# Patient Record
Sex: MALE | Race: WHITE | NOT HISPANIC OR LATINO | URBAN - METROPOLITAN AREA
[De-identification: names, ages, dates, MRNs, and addresses within clinical notes are randomized per-mention and may not be internally consistent; named-entity substitution may affect disease eponyms.]

---

## 2021-06-29 ENCOUNTER — APPOINTMENT (RX ONLY)
Dept: URBAN - METROPOLITAN AREA CLINIC 24 | Facility: CLINIC | Age: 36
Setting detail: DERMATOLOGY
End: 2021-06-29

## 2021-06-29 DIAGNOSIS — D22 MELANOCYTIC NEVI: ICD-10-CM

## 2021-06-29 DIAGNOSIS — Z85.820 PERSONAL HISTORY OF MALIGNANT MELANOMA OF SKIN: ICD-10-CM

## 2021-06-29 DIAGNOSIS — L81.4 OTHER MELANIN HYPERPIGMENTATION: ICD-10-CM

## 2021-06-29 PROBLEM — D22.62 MELANOCYTIC NEVI OF LEFT UPPER LIMB, INCLUDING SHOULDER: Status: ACTIVE | Noted: 2021-06-29

## 2021-06-29 PROBLEM — D22.5 MELANOCYTIC NEVI OF TRUNK: Status: ACTIVE | Noted: 2021-06-29

## 2021-06-29 PROCEDURE — ? COUNSELING

## 2021-06-29 PROCEDURE — 11300 SHAVE SKIN LESION 0.5 CM/<: CPT | Mod: 76

## 2021-06-29 PROCEDURE — 11300 SHAVE SKIN LESION 0.5 CM/<: CPT

## 2021-06-29 PROCEDURE — 99203 OFFICE O/P NEW LOW 30 MIN: CPT | Mod: 25

## 2021-06-29 PROCEDURE — ? SHAVE REMOVAL

## 2021-06-29 PROCEDURE — ? OTHER

## 2021-06-29 ASSESSMENT — LOCATION DETAILED DESCRIPTION DERM
LOCATION DETAILED: LEFT MID-UPPER BACK
LOCATION DETAILED: RIGHT LATERAL SUPERIOR CHEST
LOCATION DETAILED: RIGHT POSTERIOR SHOULDER
LOCATION DETAILED: LEFT POSTERIOR SHOULDER
LOCATION DETAILED: RIGHT LATERAL INFERIOR CHEST
LOCATION DETAILED: RIGHT MEDIAL KNEE
LOCATION DETAILED: LEFT ANTERIOR SHOULDER

## 2021-06-29 ASSESSMENT — LOCATION SIMPLE DESCRIPTION DERM
LOCATION SIMPLE: RIGHT SHOULDER
LOCATION SIMPLE: LEFT UPPER BACK
LOCATION SIMPLE: RIGHT KNEE
LOCATION SIMPLE: CHEST
LOCATION SIMPLE: LEFT SHOULDER

## 2021-06-29 ASSESSMENT — LOCATION ZONE DERM
LOCATION ZONE: LEG
LOCATION ZONE: TRUNK
LOCATION ZONE: ARM

## 2021-09-21 PROBLEM — R07.89 OTHER CHEST PAIN: Status: ACTIVE | Noted: 2021-09-21

## 2021-10-01 PROBLEM — E78.5 HYPERLIPIDEMIA: Status: ACTIVE | Noted: 2021-10-01

## 2021-10-01 PROBLEM — L40.50 PSORIASIS WITH ARTHROPATHY (HCC): Status: ACTIVE | Noted: 2018-08-22

## 2021-10-01 PROBLEM — M02.30 REITER'S DISEASE (HCC): Status: ACTIVE | Noted: 2021-10-01

## 2021-10-01 PROBLEM — C43.71: Status: ACTIVE | Noted: 2017-11-27

## 2022-01-04 ENCOUNTER — APPOINTMENT (RX ONLY)
Dept: URBAN - METROPOLITAN AREA CLINIC 24 | Facility: CLINIC | Age: 37
Setting detail: DERMATOLOGY
End: 2022-01-04

## 2022-01-04 DIAGNOSIS — D22 MELANOCYTIC NEVI: ICD-10-CM

## 2022-01-04 DIAGNOSIS — Z85.820 PERSONAL HISTORY OF MALIGNANT MELANOMA OF SKIN: ICD-10-CM

## 2022-01-04 DIAGNOSIS — L81.4 OTHER MELANIN HYPERPIGMENTATION: ICD-10-CM

## 2022-01-04 DIAGNOSIS — Z87.2 PERSONAL HISTORY OF DISEASES OF THE SKIN AND SUBCUTANEOUS TISSUE: ICD-10-CM

## 2022-01-04 PROBLEM — D22.5 MELANOCYTIC NEVI OF TRUNK: Status: ACTIVE | Noted: 2022-01-04

## 2022-01-04 PROBLEM — D48.5 NEOPLASM OF UNCERTAIN BEHAVIOR OF SKIN: Status: ACTIVE | Noted: 2022-01-04

## 2022-01-04 PROCEDURE — ? OTHER

## 2022-01-04 PROCEDURE — ? SHAVE REMOVAL

## 2022-01-04 PROCEDURE — ? COUNSELING

## 2022-01-04 PROCEDURE — 99213 OFFICE O/P EST LOW 20 MIN: CPT | Mod: 25

## 2022-01-04 PROCEDURE — 11305 SHAVE SKIN LESION 0.5 CM/<: CPT

## 2022-01-04 ASSESSMENT — LOCATION ZONE DERM
LOCATION ZONE: NECK
LOCATION ZONE: LEG
LOCATION ZONE: TRUNK
LOCATION ZONE: ARM

## 2022-01-04 ASSESSMENT — LOCATION DETAILED DESCRIPTION DERM
LOCATION DETAILED: RIGHT INFERIOR ANTERIOR NECK
LOCATION DETAILED: RIGHT MEDIAL KNEE
LOCATION DETAILED: RIGHT POSTERIOR SHOULDER
LOCATION DETAILED: LEFT POSTERIOR SHOULDER
LOCATION DETAILED: RIGHT LATERAL SUPERIOR CHEST
LOCATION DETAILED: LEFT MID-UPPER BACK
LOCATION DETAILED: RIGHT LATERAL INFERIOR CHEST
LOCATION DETAILED: LEFT ANTERIOR SHOULDER

## 2022-01-04 ASSESSMENT — LOCATION SIMPLE DESCRIPTION DERM
LOCATION SIMPLE: LEFT UPPER BACK
LOCATION SIMPLE: RIGHT ANTERIOR NECK
LOCATION SIMPLE: LEFT SHOULDER
LOCATION SIMPLE: CHEST
LOCATION SIMPLE: RIGHT KNEE
LOCATION SIMPLE: RIGHT SHOULDER

## 2022-03-19 PROBLEM — L40.50 PSORIASIS WITH ARTHROPATHY (HCC): Status: ACTIVE | Noted: 2018-08-22

## 2022-03-19 PROBLEM — C43.71: Status: ACTIVE | Noted: 2017-11-27

## 2022-03-19 PROBLEM — R07.89 OTHER CHEST PAIN: Status: ACTIVE | Noted: 2021-09-21

## 2022-03-19 PROBLEM — E78.5 HYPERLIPIDEMIA: Status: ACTIVE | Noted: 2021-10-01

## 2022-03-20 PROBLEM — M02.30 REITER'S DISEASE (HCC): Status: ACTIVE | Noted: 2021-10-01

## 2022-05-18 ENCOUNTER — TRANSCRIBE ORDER (OUTPATIENT)
Dept: SCHEDULING | Age: 37
End: 2022-05-18

## 2022-06-09 RX ORDER — LISINOPRIL 10 MG/1
TABLET ORAL
Qty: 90 TABLET | Refills: 3 | Status: SHIPPED | OUTPATIENT
Start: 2022-06-09 | End: 2022-09-21 | Stop reason: SDUPTHER

## 2022-07-05 ENCOUNTER — TELEPHONE (OUTPATIENT)
Dept: RHEUMATOLOGY | Age: 37
End: 2022-07-05

## 2022-07-05 RX ORDER — IXEKIZUMAB 80 MG/ML
80 INJECTION, SOLUTION SUBCUTANEOUS
Qty: 3 ML | Refills: 1 | Status: CANCELLED | OUTPATIENT
Start: 2022-07-05

## 2022-07-06 NOTE — TELEPHONE ENCOUNTER
I cannot approve this as I do not have access to neg tb / hep panel that should have been sent by prev rheum. If we cannot get this then needs to be repeated.

## 2022-07-06 NOTE — TELEPHONE ENCOUNTER
PHONE CALL to patient to see if he could come by for labs- QFG and Hepatitis testing. Has 2 Taltz in the [address] takes it on the 15th of each month. I have scheduled him for 8/1/22 to come in for labs. Do you want any other tests other than the TB and hepatitis?  His next Ov is January 2023

## 2022-07-06 NOTE — TELEPHONE ENCOUNTER
Not sure if records have been received to continue the [address]. Entering rx to go to [address]- review and sign if you approve.  Next Ov is oli for Jan.

## 2022-07-06 NOTE — TELEPHONE ENCOUNTER
----- Message from Surya Conway MA sent at 5/19/2022  9:57 PM EDT -----  Regarding: Kody Ng MD [facility], this may be a premature message but patient is currently taking Tracy Spindle as prescribed by his previous rheumatologist. Jacky Chapman has been receiving from [facility] are requesting records including hepatitis, TB testing and will have to repeat if not current. Alberta Pino you at least investigate pharmacy information on him and we can place refill orders once basic screening labs are available? Nawaf Masters you

## 2022-09-20 ENCOUNTER — TELEPHONE (OUTPATIENT)
Dept: CARDIOLOGY CLINIC | Age: 37
End: 2022-09-20

## 2022-09-20 NOTE — TELEPHONE ENCOUNTER
MEDICATION REFILL REQUEST      Name of Medication:  Lisinopril   Dose:  10 mg  Frequency:    Quantity:    Days' supply:  80      Pharmacy Name/Location:  did not leave     Patient stated on voicemail that he needed to give new insurance information to nurse.  Please call patient

## 2022-09-21 RX ORDER — LISINOPRIL 10 MG/1
10 TABLET ORAL DAILY
Qty: 90 TABLET | Refills: 0 | Status: SHIPPED | OUTPATIENT
Start: 2022-09-21

## 2022-11-03 NOTE — TELEPHONE ENCOUNTER
Called the Cell # listed for him. [phone]. The person I spoke with is named Jagdeep Medrano, but he lives in [facility] and his birthday is not [birth year]. He used to live in [place], [place], but not in [place]. The home number has a VM - LEFT MESSAGE on the  for him to call back regarding his labs. Patient called back- due Nov 15, dec 15. Refills are still on rx from his previous dr. Sepideh Huang pt for labs 11/7/22.

## 2022-11-07 ENCOUNTER — NURSE ONLY (OUTPATIENT)
Dept: RHEUMATOLOGY | Age: 37
End: 2022-11-07

## 2022-11-07 DIAGNOSIS — L40.50 PSORIATIC ARTHRITIS (HCC): ICD-10-CM

## 2022-11-07 LAB
ALBUMIN SERPL-MCNC: 4.4 G/DL (ref 3.5–5)
ALBUMIN/GLOB SERPL: 1.4 {RATIO} (ref 0.4–1.6)
ALP SERPL-CCNC: 50 U/L (ref 50–136)
ALT SERPL-CCNC: 35 U/L (ref 12–65)
ANION GAP SERPL CALC-SCNC: 2 MMOL/L (ref 2–11)
AST SERPL-CCNC: 27 U/L (ref 15–37)
BASOPHILS # BLD: 0.1 K/UL (ref 0–0.2)
BASOPHILS NFR BLD: 1 % (ref 0–2)
BILIRUB SERPL-MCNC: 0.4 MG/DL (ref 0.2–1.1)
BUN SERPL-MCNC: 17 MG/DL (ref 6–23)
CALCIUM SERPL-MCNC: 10.1 MG/DL (ref 8.3–10.4)
CHLORIDE SERPL-SCNC: 104 MMOL/L (ref 101–110)
CO2 SERPL-SCNC: 29 MMOL/L (ref 21–32)
CREAT SERPL-MCNC: 1.5 MG/DL (ref 0.8–1.5)
CRP SERPL-MCNC: <0.3 MG/DL (ref 0–0.9)
DIFFERENTIAL METHOD BLD: ABNORMAL
EOSINOPHIL # BLD: 0.5 K/UL (ref 0–0.8)
EOSINOPHIL NFR BLD: 10 % (ref 0.5–7.8)
ERYTHROCYTE [DISTWIDTH] IN BLOOD BY AUTOMATED COUNT: 13 % (ref 11.9–14.6)
ERYTHROCYTE [SEDIMENTATION RATE] IN BLOOD: 12 MM/HR (ref 0–20)
GLOBULIN SER CALC-MCNC: 3.1 G/DL (ref 2.8–4.5)
GLUCOSE SERPL-MCNC: 89 MG/DL (ref 65–100)
HAV IGM SER QL: NONREACTIVE
HBV CORE IGM SER QL: NONREACTIVE
HBV SURFACE AG SER QL: NONREACTIVE
HCT VFR BLD AUTO: 47.4 % (ref 41.1–50.3)
HCV AB SER QL: NONREACTIVE
HGB BLD-MCNC: 16.2 G/DL (ref 13.6–17.2)
IMM GRANULOCYTES # BLD AUTO: 0 K/UL (ref 0–0.5)
IMM GRANULOCYTES NFR BLD AUTO: 0 % (ref 0–5)
LYMPHOCYTES # BLD: 1.9 K/UL (ref 0.5–4.6)
LYMPHOCYTES NFR BLD: 37 % (ref 13–44)
MCH RBC QN AUTO: 30.1 PG (ref 26.1–32.9)
MCHC RBC AUTO-ENTMCNC: 34.2 G/DL (ref 31.4–35)
MCV RBC AUTO: 88.1 FL (ref 82–102)
MONOCYTES # BLD: 0.5 K/UL (ref 0.1–1.3)
MONOCYTES NFR BLD: 10 % (ref 4–12)
NEUTS SEG # BLD: 2.1 K/UL (ref 1.7–8.2)
NEUTS SEG NFR BLD: 42 % (ref 43–78)
NRBC # BLD: 0 K/UL (ref 0–0.2)
PLATELET # BLD AUTO: 254 K/UL (ref 150–450)
PMV BLD AUTO: 10.1 FL (ref 9.4–12.3)
POTASSIUM SERPL-SCNC: 4.4 MMOL/L (ref 3.5–5.1)
PROT SERPL-MCNC: 7.5 G/DL (ref 6.3–8.2)
RBC # BLD AUTO: 5.38 M/UL (ref 4.23–5.6)
SODIUM SERPL-SCNC: 135 MMOL/L (ref 133–143)
WBC # BLD AUTO: 5.1 K/UL (ref 4.3–11.1)

## 2022-11-17 LAB
M TB IFN-G BLD-IMP: NEGATIVE
QUANTIFERON CRITERIA: NORMAL
QUANTIFERON MITOGEN VALUE: >10 IU/ML
QUANTIFERON NIL VALUE: 0.05 IU/ML
QUANTIFERON TB1 AG: 0.37 IU/ML
QUANTIFERON TB2 AG: 0.33 IU/ML
QUANTIFERON, INCUBATION: NORMAL

## 2022-11-18 NOTE — TELEPHONE ENCOUNTER
Labs done 11/7/22. Next Ov is 1/3/23 with Dr. Trinh Stallworth. Needs Taltz rx to last until next visit.  Rx entered and pended for review and sig    Lab Results   Component Value Date    WBC 5.1 11/07/2022    HGB 16.2 11/07/2022    HCT 47.4 11/07/2022    MCV 88.1 11/07/2022     11/07/2022     Lab Results   Component Value Date     11/07/2022    K 4.4 11/07/2022     11/07/2022    CO2 29 11/07/2022    BUN 17 11/07/2022    CREATININE 1.50 11/07/2022    GLUCOSE 89 11/07/2022    CALCIUM 10.1 11/07/2022    PROT 7.5 11/07/2022    LABALBU 4.4 11/07/2022    BILITOT 0.4 11/07/2022    ALKPHOS 50 11/07/2022    AST 27 11/07/2022    ALT 35 11/07/2022    LABGLOM >60 11/07/2022    GLOB 3.1 11/07/2022     Lab Results   Component Value Date    CRP <0.3 11/07/2022

## 2022-11-29 RX ORDER — IXEKIZUMAB 80 MG/ML
80 INJECTION, SOLUTION SUBCUTANEOUS
Qty: 1 ML | Refills: 1 | Status: SHIPPED | OUTPATIENT
Start: 2022-11-29

## 2023-03-13 ENCOUNTER — OFFICE VISIT (OUTPATIENT)
Dept: RHEUMATOLOGY | Age: 38
End: 2023-03-13
Payer: COMMERCIAL

## 2023-03-13 VITALS
SYSTOLIC BLOOD PRESSURE: 138 MMHG | HEART RATE: 89 BPM | WEIGHT: 187 LBS | HEIGHT: 66 IN | DIASTOLIC BLOOD PRESSURE: 94 MMHG | BODY MASS INDEX: 30.05 KG/M2

## 2023-03-13 DIAGNOSIS — L40.50 PSORIATIC ARTHRITIS (HCC): Primary | ICD-10-CM

## 2023-03-13 DIAGNOSIS — M76.61 RIGHT ACHILLES TENDINITIS: ICD-10-CM

## 2023-03-13 DIAGNOSIS — Z51.81 MEDICATION MONITORING ENCOUNTER: ICD-10-CM

## 2023-03-13 DIAGNOSIS — H53.143 PHOTOPHOBIA OF BOTH EYES: ICD-10-CM

## 2023-03-13 DIAGNOSIS — I10 PRIMARY HYPERTENSION: ICD-10-CM

## 2023-03-13 PROCEDURE — 99214 OFFICE O/P EST MOD 30 MIN: CPT | Performed by: INTERNAL MEDICINE

## 2023-03-13 PROCEDURE — 3075F SYST BP GE 130 - 139MM HG: CPT | Performed by: INTERNAL MEDICINE

## 2023-03-13 PROCEDURE — 3080F DIAST BP >= 90 MM HG: CPT | Performed by: INTERNAL MEDICINE

## 2023-03-13 RX ORDER — DICLOFENAC SODIUM 20 MG/G
1 SOLUTION TOPICAL 2 TIMES DAILY PRN
Qty: 112 G | Refills: 2 | Status: SHIPPED | OUTPATIENT
Start: 2023-03-13

## 2023-03-13 ASSESSMENT — ROUTINE ASSESSMENT OF PATIENT INDEX DATA (RAPID3)
WHEN YOU AWAKENED IN THE MORNING OVER THE LAST WEEK, PLEASE INDICATE THE AMOUNT OF TIME IT TAKES UNTIL YOU ARE AS LIMBER AS YOU WILL BE FOR THE DAY: < 10 MIN
ON A SCALE OF ONE TO TEN, HOW MUCH OF A PROBLEM HAS UNUSUAL FATIGUE OR TIREDNESS BEEN FOR YOU OVER THE PAST WEEK?: 0
ON A SCALE OF ONE TO TEN, HOW MUCH PAIN HAVE YOU HAD BECAUSE OF YOUR CONDITION OVER THE PAST WEEK?: 3
ON A SCALE OF ONE TO TEN, HOW DIFFICULT WAS IT FOR YOU TO COMPLETE THE LISTED DAILY PHYSICAL TASKS OVER THE LAST WEEK: 0.5
ON A SCALE OF ONE TO TEN, CONSIDERING ALL THE WAYS IN WHICH ILLNESS AND HEALTH CONDITIONS MAY AFFECT YOU AT THIS TIME, PLEASE INDICATE BELOW HOW YOU ARE DOING:: 5

## 2023-03-13 NOTE — PROGRESS NOTES
`3/13/2023    SUBJECTIVE:  Israel Aly is a 44 y.o. 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 Maintained on taltz    R heel pain is worse - tried aleve 2 tabs without benefit. Pain is 24h/day but worse with walking. Also episodic pain with R heel, plantar fascia. Heel pain has increased over the last year. He is unable to exercise due to symptoms. Previously saw Dr. Av Stephens reviewed [address]-diagnosed with right Achilles tendinitis and recommended to return if symptoms increase. Since Aleve has not been beneficial he is not using it. Bilateral #1 MTP pain with walking with light trauma. Has jock itch rash that recurred-she started using [employer] and rash is resolved now-now only has mild redness. Onset few months ago. Noticing enlargement in bilateral big toes. Denies dactylitis  Bilateral Eye redness & itching. No blurry vision or pain. +photophobia. Using eye drop otc  TID-which is helping. No recent evaluation by ophthalmology    No rash or nail changes  Denies bloody bowel movements, stomach pain, nausea, vomiting  Taking taltz in 2 days.       REVIEW OF SYSTEMS:  A total of 10 systems (musculoskeletal system as stated in the HPI and the following 9 systems) were reviewed with patient today and were negative except as stated in the HPI and except for the following (depicted with an \"X\"):        \"X\" Constitutional  \"X\" HEENT /Mouth  \"X\" Cardiovascular and  Respiratory (2 systems)  \"X\" Gastrointestinal    Fever/chills   Hair loss   Shortness of breath   Upset stomach    Falls   Dry mouth   Coughing   Diarrhea / constipation    Wt loss   Mouth sores   Wheezing   Heartburn    Wt gain   Ringing ears   Chest pain   Dark or bloody stools    Night sweats   Diff. swallowing  X None of above   Nausea or vomiting   X None of above  X None of above     X None of above                \"X\" Integumentary  \"X\" Neurological  \"X\" Genitourinary  \"X\" Psychiatric    Easy bruising   Numbness/ tingling   Female problems  x Depression    Rashes   Weakness   Problems with urination  x Feeling anxious    Sun sensitivity   Headaches  X None of above  x Problems sleeping   X None of above  X None of above      None of above       The following portions of the patient's history were reviewed and updated as appropriate:     Current Outpatient Medications:     Diclofenac Sodium 2 % SOLN, Apply 1 Pump topically 2 times daily as needed (pain), Disp: 112 g, Rfl: 2    ixekizumab (TALTZ) 80 MG/ML SOAJ prefilled syringe, Inject 1 mL into the skin every 28 days, Disp: 1 mL, Rfl: 1    lisinopril (PRINIVIL;ZESTRIL) 10 MG tablet, Take 1 tablet by mouth daily, Disp: 90 tablet, Rfl: 0    LORazepam (ATIVAN) 1 MG tablet, Take by mouth every 4 hours as needed. , Disp: , Rfl:         No Known Allergies        PHYSICAL EXAM:  BP (!) 138/94   Pulse 89   Ht 5' 6\" (1.676 m)   Wt 187 lb (84.8 kg)   BMI 30.18 kg/m²   CONSTITUTIONAL:  The patient was in NAD.  ENT:  The OPC, MMM, lips/teeth/gums without abnormalities. NECK:  No masses or thyromegaly  LYMPH NODES:  No cervical or axillary lymphadenopathy. CV:  RRR no r/m/g. Normal peripheral pulses  RESP:  CTA bilaterally. Normal respiratory effort. GI:  Abdomen soft, non tender, no hepatosplenomegaly  Skin:  No rashes, nodules, or other lesions. MUSCULOSKELETAL :  All joints including neck, back bilateral shoulders, elbows, wrists, MCP's, PIP's, DIP's, hips, knees, ankles, toes are within normal limits including normal gross examination, no synovitis, no tenderness, n'l ROM EXCEPT:       ASSESSMENT AND PLAN:  Abbi Chanel is a 44 y.o. male that is here for evaluation of psoriatic arthritis. his problems include:    Meenakshi Florentino was seen today for follow-up.     Diagnoses and all orders for this visit:    Psoriatic arthritis ([facility] [address]    Medication monitoring encounter  Continue Taltz. Appears medication is improving inflammatory complaints. Check additional diagnostic labs with next set-he would like to do in the next few months  -     Rheumatoid Factor; Future  -     HLA-B27 Antigen; Future  -     CCP Antibodies, IGG/IGA; Future  -     CBC with Auto Differential; Standing  -     Comprehensive Metabolic Panel; Standing  -     C-Reactive Protein; Standing  -     Sedimentation Rate; Standing      Photophobia of both eyes  Reports symptoms bilateral redness, itchiness improved with OTC drop. Associated photophobia. Referred to ophthalmology  -     [address]    Right Achilles tendinitis  Primary hypertension    Ongoing chronic symptoms of right Achilles tendinitis which are increasing in nature in the last 1 year. No benefit with Aleve for 440 Mg as needed. Given sample of Pennsaid and I have sent in prescription now to local pharmacy per his request.  Recommend oral scheduled NSAID for around 2 weeks in addition to PT. He would like to try topical NSAID and will initiate ibuprofen not to exceed 800 mg 3 times daily if no benefit. Advised patient to check blood pressure at home as he does have hypertension. Other orders  -     Diclofenac Sodium 2 % SOLN; Apply 1 Pump topically 2 times daily as needed (pain)    Patient would like to do labs in the next few months including ESR, CRP, B27, RF, CCP. Follow-up in 6 months or sooner if needed: Standing    Lemuel Carrington MD  [address]  [address] [facility], [address]  [phone]

## 2023-03-13 NOTE — PATIENT INSTRUCTIONS
Ibuprofen 800 mg three times daily  OR  Naproxen 500 mg twice daily     With diclofenac 1 % gel four times daily - OTC

## 2023-03-21 ENCOUNTER — TELEPHONE (OUTPATIENT)
Dept: RHEUMATOLOGY | Age: 38
End: 2023-03-21

## 2023-03-21 NOTE — TELEPHONE ENCOUNTER
PA for Diclofenac 2% solution started online on CMM. PA approved by [employer]. Approved today  [facility][id number]:[id number]; Review Type:Prior Auth; Coverage Start Date:02/19/2023; Coverage End Date:03/20/2024;

## 2023-09-07 ENCOUNTER — TELEPHONE (OUTPATIENT)
Dept: RHEUMATOLOGY | Age: 38
End: 2023-09-07

## 2023-09-07 NOTE — TELEPHONE ENCOUNTER
Tried to reach pt again re: upcoming appt and labs not done. Lvm second time. Have not recd response from first attempt.

## 2023-09-11 ENCOUNTER — TELEPHONE (OUTPATIENT)
Dept: RHEUMATOLOGY | Age: 38
End: 2023-09-11

## 2023-10-02 ENCOUNTER — TELEPHONE (OUTPATIENT)
Dept: RHEUMATOLOGY | Age: 38
End: 2023-10-02

## 2023-10-02 NOTE — TELEPHONE ENCOUNTER
PT LVM over weekend asking for refill of taltz. Pt appt was moved due to labs missing. Pt has not done labs. Tried to rtc to pt  Unable to lvm or reach pt, no login to new [employer]. VM IS FULL. PT NEEDS TO DO LABS. Prior messages that were able to be left from early Sept relayed this to pt.

## 2023-10-02 NOTE — TELEPHONE ENCOUNTER
Pt rtc. Was not aware that vm was full. Asks about lab locations. Closest to [place]. Will do labs today or tues.

## 2023-10-03 DIAGNOSIS — L40.50 PSORIATIC ARTHRITIS (HCC): ICD-10-CM

## 2023-10-03 LAB
ALBUMIN SERPL-MCNC: 4 G/DL (ref 3.5–5)
ALBUMIN/GLOB SERPL: 1.3 (ref 0.4–1.6)
ALP SERPL-CCNC: 50 U/L (ref 50–136)
ALT SERPL-CCNC: 50 U/L (ref 12–65)
ANION GAP SERPL CALC-SCNC: 6 MMOL/L (ref 2–11)
AST SERPL-CCNC: 41 U/L (ref 15–37)
BASOPHILS # BLD: 0 K/UL (ref 0–0.2)
BASOPHILS NFR BLD: 1 % (ref 0–2)
BILIRUB SERPL-MCNC: 0.3 MG/DL (ref 0.2–1.1)
BUN SERPL-MCNC: 23 MG/DL (ref 6–23)
CALCIUM SERPL-MCNC: 9.7 MG/DL (ref 8.3–10.4)
CHLORIDE SERPL-SCNC: 108 MMOL/L (ref 101–110)
CO2 SERPL-SCNC: 29 MMOL/L (ref 21–32)
CREAT SERPL-MCNC: 1.3 MG/DL (ref 0.8–1.5)
CRP SERPL-MCNC: <0.3 MG/DL (ref 0–0.9)
DIFFERENTIAL METHOD BLD: ABNORMAL
EOSINOPHIL # BLD: 0.4 K/UL (ref 0–0.8)
EOSINOPHIL NFR BLD: 7 % (ref 0.5–7.8)
ERYTHROCYTE [DISTWIDTH] IN BLOOD BY AUTOMATED COUNT: 12.9 % (ref 11.9–14.6)
ERYTHROCYTE [SEDIMENTATION RATE] IN BLOOD: 2 MM/HR (ref 0–20)
GLOBULIN SER CALC-MCNC: 3.1 G/DL (ref 2.8–4.5)
GLUCOSE SERPL-MCNC: 85 MG/DL (ref 65–100)
HCT VFR BLD AUTO: 47.1 % (ref 41.1–50.3)
HGB BLD-MCNC: 15.8 G/DL (ref 13.6–17.2)
IMM GRANULOCYTES # BLD AUTO: 0 K/UL (ref 0–0.5)
IMM GRANULOCYTES NFR BLD AUTO: 0 % (ref 0–5)
LYMPHOCYTES # BLD: 2.1 K/UL (ref 0.5–4.6)
LYMPHOCYTES NFR BLD: 41 % (ref 13–44)
MCH RBC QN AUTO: 29.6 PG (ref 26.1–32.9)
MCHC RBC AUTO-ENTMCNC: 33.5 G/DL (ref 31.4–35)
MCV RBC AUTO: 88.4 FL (ref 82–102)
MONOCYTES # BLD: 0.5 K/UL (ref 0.1–1.3)
MONOCYTES NFR BLD: 9 % (ref 4–12)
NEUTS SEG # BLD: 2.1 K/UL (ref 1.7–8.2)
NEUTS SEG NFR BLD: 42 % (ref 43–78)
NRBC # BLD: 0 K/UL (ref 0–0.2)
PLATELET # BLD AUTO: 275 K/UL (ref 150–450)
PMV BLD AUTO: 9.7 FL (ref 9.4–12.3)
POTASSIUM SERPL-SCNC: 4.7 MMOL/L (ref 3.5–5.1)
PROT SERPL-MCNC: 7.1 G/DL (ref 6.3–8.2)
RBC # BLD AUTO: 5.33 M/UL (ref 4.23–5.6)
SODIUM SERPL-SCNC: 143 MMOL/L (ref 133–143)
WBC # BLD AUTO: 5.1 K/UL (ref 4.3–11.1)

## 2023-10-04 LAB
CCP IGA+IGG SERPL IA-ACNC: 4 UNITS (ref 0–19)
RHEUMATOID FACT SER QL LA: NEGATIVE

## 2023-10-09 LAB — HLA-B27 QL NAA+PROBE: POSITIVE

## 2023-10-18 ENCOUNTER — TELEMEDICINE (OUTPATIENT)
Dept: RHEUMATOLOGY | Age: 38
End: 2023-10-18
Payer: COMMERCIAL

## 2023-10-18 DIAGNOSIS — Z51.81 MEDICATION MONITORING ENCOUNTER: ICD-10-CM

## 2023-10-18 DIAGNOSIS — L40.50 PSORIATIC ARTHRITIS (HCC): Primary | ICD-10-CM

## 2023-10-18 DIAGNOSIS — R43.0 LOSS OF SMELL: ICD-10-CM

## 2023-10-18 DIAGNOSIS — R09.81 NASAL CONGESTION: ICD-10-CM

## 2023-10-18 DIAGNOSIS — R74.01 ELEVATED AST (SGOT): ICD-10-CM

## 2023-10-18 DIAGNOSIS — H57.89 REDNESS, EYE: ICD-10-CM

## 2023-10-18 DIAGNOSIS — Z23 ENCOUNTER FOR VACCINATION: ICD-10-CM

## 2023-10-18 PROCEDURE — 99214 OFFICE O/P EST MOD 30 MIN: CPT | Performed by: INTERNAL MEDICINE

## 2023-10-18 RX ORDER — SERTRALINE HYDROCHLORIDE 25 MG/1
25 TABLET, FILM COATED ORAL DAILY
COMMUNITY

## 2023-10-18 RX ORDER — IXEKIZUMAB 80 MG/ML
80 INJECTION, SOLUTION SUBCUTANEOUS
Qty: 1 ML | Refills: 5 | Status: SHIPPED | OUTPATIENT
Start: 2023-10-18

## 2023-10-18 NOTE — PROGRESS NOTES
Vane Larios is a 43 y.o. male who was seen by synchronous (real-time) audio-video technology on 10/18/2023        I was in the office While conducting this encounter. She and/ or her healthcare decision maker is aware that this patient-initialed Telehealth encounter is a billable service with coverage as determined by her insurance carrier. She is aware that she may receive a bill and has provided verbal consent to proceed: Yes    The Virtual visit was conducted via [address] [place] [address]. Pursuant to the emergency declaration under the [place] [address] waiver authority and the [employer] and [employer] Act, this Virtual  Visit was conducted to reduce the patient's risk of exposure to COVID-19 and provide continuity of care for an established patient. Services were provided through a video synchronous discussion virtually to substitute for in-person clinic visit. Due to this being a TeleHealth evaluation, many elements of the physical examination are unable to be assessed. Total Time:  minutes: 21-30 minutes           Assessment & Plan:     Psoriatic arthritis, medication monitoring, elevated AST  - Continue Taltz. Advised patient to follow-up at the end of this week to discuss URI type symptoms to determine if see if to take injection  - AST is less than 2 times upper limit of normal  - Plan for standing labs prior to next office visit in 6 months    Redness of eye, loss of smell, nasal congestion  Ongoing treatment for pinkeye is not improving symptoms. He is taken several COVID home tests that are negative and has associated loss of smell.   - Ophthalmology referral placed for evaluation of chronic eye redness, mild episodic photophobia-concern for possible inflammatory component from psoriasis  - COVID test ordered for patient  - he will follow-up later this week to discuss symptoms and was advised to go to closest emergency room if eye pain

## 2023-11-01 NOTE — TELEPHONE ENCOUNTER
Let him know he has degenerative changes in his neck worse at C5-C6.   He has central canal stenosis and foraminal stenosis.      Refer to Pain Management. I will put in an order. Requested Prescriptions     Pending Prescriptions Disp Refills    lisinopril (PRINIVIL;ZESTRIL) 10 MG tablet [Pharmacy Med Name: LISINOPRIL 10MG TABLETS] 90 tablet 3     Sig: TAKE 1 TABLET BY MOUTH DAILY

## 2023-11-03 ENCOUNTER — TELEPHONE (OUTPATIENT)
Dept: RHEUMATOLOGY | Age: 38
End: 2023-11-03

## 2023-11-03 NOTE — TELEPHONE ENCOUNTER
Letter received from [employer] stating that they have not received requested information from our office. I do not recall receiving any PA request for Taltz. Initiated PA online on CMM, sent with last 2 OV notes to [employer]. PA approved  Message from plan: CaseId:[id number];Status:Approved; Review Type:Prior Auth; Coverage Start Date:10/04/2023; Coverage End Date:11/02/2024;

## 2023-11-22 ENCOUNTER — TELEPHONE (OUTPATIENT)
Age: 38
End: 2023-11-22

## 2023-11-22 NOTE — TELEPHONE ENCOUNTER
Spoke to pt and informed he is due for an appointment. Pt has enough medication, appointment given for 11/28 at 10:15.

## 2023-11-22 NOTE — TELEPHONE ENCOUNTER
MEDICATION REFILL REQUEST      Name of Medication:  Lisinopril  Dose:  10 mg  Frequency:  QD  Quantity:  90  Days' supply:  90      Pharmacy Name/Location:  [phone]

## 2023-11-28 ENCOUNTER — OFFICE VISIT (OUTPATIENT)
Age: 38
End: 2023-11-28
Payer: COMMERCIAL

## 2023-11-28 VITALS
SYSTOLIC BLOOD PRESSURE: 138 MMHG | BODY MASS INDEX: 33.46 KG/M2 | DIASTOLIC BLOOD PRESSURE: 86 MMHG | WEIGHT: 196 LBS | HEIGHT: 64 IN | HEART RATE: 68 BPM

## 2023-11-28 DIAGNOSIS — I10 PRIMARY HYPERTENSION: Primary | ICD-10-CM

## 2023-11-28 PROCEDURE — 3079F DIAST BP 80-89 MM HG: CPT | Performed by: INTERNAL MEDICINE

## 2023-11-28 PROCEDURE — 93000 ELECTROCARDIOGRAM COMPLETE: CPT | Performed by: INTERNAL MEDICINE

## 2023-11-28 PROCEDURE — 99214 OFFICE O/P EST MOD 30 MIN: CPT | Performed by: INTERNAL MEDICINE

## 2023-11-28 PROCEDURE — 3075F SYST BP GE 130 - 139MM HG: CPT | Performed by: INTERNAL MEDICINE

## 2023-11-28 RX ORDER — LISINOPRIL 10 MG/1
10 TABLET ORAL DAILY
Qty: 90 TABLET | Refills: 3 | Status: SHIPPED | OUTPATIENT
Start: 2023-11-28

## 2023-11-28 ASSESSMENT — PATIENT HEALTH QUESTIONNAIRE - PHQ9
SUM OF ALL RESPONSES TO PHQ9 QUESTIONS 1 & 2: 0
SUM OF ALL RESPONSES TO PHQ QUESTIONS 1-9: 0
1. LITTLE INTEREST OR PLEASURE IN DOING THINGS: 0
2. FEELING DOWN, DEPRESSED OR HOPELESS: 0
SUM OF ALL RESPONSES TO PHQ QUESTIONS 1-9: 0

## 2023-11-28 ASSESSMENT — ENCOUNTER SYMPTOMS
ORTHOPNEA: 0
ABDOMINAL PAIN: 0
SHORTNESS OF BREATH: 0

## 2023-11-28 NOTE — PROGRESS NOTES
[facility] CARDIOLOGY  [address] [address]  PHONE: [phone]      23    NAME:  Fidencio Ragsdale  : 1985  MRN: [id number]         SUBJECTIVE:   Fidencio Ragsdale is a 43 y.o. male seen for a follow up visit regarding the following:     No chief complaint on file. HPI:  Follow up  No chief complaint on file. .    Mr. Jovanna Rousseau presents today for follow-up. Patient is doing well. He was last seen by Dr. Erum Beltrán 2 years ago. Had a normal stress echo around that time. Blood pressure he reports has been ranging in the 130s over 80s. Recently was in her recovery program for alcohol and benzo use. Feels better. Works out frequently but typically just does weights, minimal cardio. He endorses cooking with a heavy hand with salt. Key CAD CHF Meds            lisinopril (PRINIVIL;ZESTRIL) 10 MG tablet (Taking)    Sig - Route: Take 1 tablet by mouth daily - Oral          Key Antihyperglycemic Medications       Patient is on no antihyperglycemic meds. Past Medical History, Past Surgical History, Family history, Social History, and Medications were all reviewed with the patient today and updated as necessary. Prior to Admission medications    Medication Sig Start Date End Date Taking? Authorizing Provider   Carboxymethylcellulose Sodium (EYE DROPS OP) Apply to eye   Yes Jeramie Avalos MD   lisinopril (PRINIVIL;ZESTRIL) 10 MG tablet Take 1 tablet by mouth daily 23  Yes Iven Cabot, MD   sertraline (ZOLOFT) 25 MG tablet Take 1 tablet by mouth daily   Yes Jeramie Avalos MD   ixekizumab (TALTZ) 80 MG/ML SOAJ prefilled syringe Inject 1 mL into the skin every 28 days 10/18/23  Yes Virgel Scheuermann, MD     No Known Allergies  Past Medical History:   Diagnosis Date    Alcohol abuse 2015    Alcohol abuse     Arthritis     \"Reactive\" arthritis of feet, ankles, and hands. Difficulty walking.     Chronic anxiety

## 2024-04-23 ENCOUNTER — TELEPHONE (OUTPATIENT)
Dept: RHEUMATOLOGY | Age: 39
End: 2024-04-23

## 2024-04-23 DIAGNOSIS — L40.50 PSORIATIC ARTHRITIS (HCC): Primary | ICD-10-CM

## 2024-04-23 DIAGNOSIS — R74.01 ELEVATED AST (SGOT): ICD-10-CM

## 2024-04-23 NOTE — TELEPHONE ENCOUNTER
Pt asking for refill on taltz  Rtc to pt and got vm.  Lvm.    Pt needs to make f/u appt and update labs.  LOV October, needs 6mo ov.  Requested rtc or mychart msg for pt to schedule, do labs prior and for interim refill.    No active orders, entered x4 \"standing\" as per lab hx

## 2024-04-24 NOTE — TELEPHONE ENCOUNTER
Pt has f/u 5/22 on waitlist.  Doing labs this week, needs refill on taltz to cover.    Waiting on lab results.    Due to inject again 5/15

## 2024-04-29 DIAGNOSIS — R74.01 ELEVATED AST (SGOT): ICD-10-CM

## 2024-04-29 DIAGNOSIS — L40.50 PSORIATIC ARTHRITIS (HCC): ICD-10-CM

## 2024-04-29 LAB
ALBUMIN SERPL-MCNC: 4.5 G/DL (ref 3.5–5)
ALBUMIN/GLOB SERPL: 1.6 (ref 1–1.9)
ALP SERPL-CCNC: 48 U/L (ref 40–129)
ALT SERPL-CCNC: 31 U/L (ref 12–65)
ANION GAP SERPL CALC-SCNC: 9 MMOL/L (ref 9–18)
AST SERPL-CCNC: 38 U/L (ref 15–37)
BASOPHILS # BLD: 0 K/UL (ref 0–0.2)
BASOPHILS NFR BLD: 1 % (ref 0–2)
BILIRUB SERPL-MCNC: 0.3 MG/DL (ref 0–1.2)
BUN SERPL-MCNC: 21 MG/DL (ref 6–23)
CALCIUM SERPL-MCNC: 9.9 MG/DL (ref 8.8–10.2)
CHLORIDE SERPL-SCNC: 102 MMOL/L (ref 98–107)
CO2 SERPL-SCNC: 27 MMOL/L (ref 20–28)
CREAT SERPL-MCNC: 1.12 MG/DL (ref 0.8–1.3)
DIFFERENTIAL METHOD BLD: ABNORMAL
EOSINOPHIL # BLD: 0.4 K/UL (ref 0–0.8)
EOSINOPHIL NFR BLD: 8 % (ref 0.5–7.8)
ERYTHROCYTE [DISTWIDTH] IN BLOOD BY AUTOMATED COUNT: 13.6 % (ref 11.9–14.6)
ERYTHROCYTE [SEDIMENTATION RATE] IN BLOOD: 5 MM/HR (ref 0–20)
GLOBULIN SER CALC-MCNC: 2.7 G/DL (ref 2.3–3.5)
GLUCOSE SERPL-MCNC: 104 MG/DL (ref 70–99)
HCT VFR BLD AUTO: 45.2 % (ref 41.1–50.3)
HGB BLD-MCNC: 14.9 G/DL (ref 13.6–17.2)
IMM GRANULOCYTES # BLD AUTO: 0 K/UL (ref 0–0.5)
IMM GRANULOCYTES NFR BLD AUTO: 0 % (ref 0–5)
LYMPHOCYTES # BLD: 1.8 K/UL (ref 0.5–4.6)
LYMPHOCYTES NFR BLD: 38 % (ref 13–44)
MCH RBC QN AUTO: 28.8 PG (ref 26.1–32.9)
MCHC RBC AUTO-ENTMCNC: 33 G/DL (ref 31.4–35)
MCV RBC AUTO: 87.3 FL (ref 82–102)
MONOCYTES # BLD: 0.4 K/UL (ref 0.1–1.3)
MONOCYTES NFR BLD: 9 % (ref 4–12)
NEUTS SEG # BLD: 2.1 K/UL (ref 1.7–8.2)
NEUTS SEG NFR BLD: 44 % (ref 43–78)
NRBC # BLD: 0 K/UL (ref 0–0.2)
PLATELET # BLD AUTO: 258 K/UL (ref 150–450)
PMV BLD AUTO: 9.7 FL (ref 9.4–12.3)
POTASSIUM SERPL-SCNC: 4.5 MMOL/L (ref 3.5–5.1)
PROT SERPL-MCNC: 7.2 G/DL (ref 6.3–8.2)
RBC # BLD AUTO: 5.18 M/UL (ref 4.23–5.6)
SODIUM SERPL-SCNC: 138 MMOL/L (ref 136–145)
WBC # BLD AUTO: 4.8 K/UL (ref 4.3–11.1)

## 2024-05-01 LAB — CRP SERPL HS-MCNC: 3.5 MG/L (ref 0–3)

## 2024-05-07 ENCOUNTER — OFFICE VISIT (OUTPATIENT)
Dept: RHEUMATOLOGY | Age: 39
End: 2024-05-07
Payer: COMMERCIAL

## 2024-05-07 VITALS
DIASTOLIC BLOOD PRESSURE: 74 MMHG | HEIGHT: 64 IN | SYSTOLIC BLOOD PRESSURE: 128 MMHG | BODY MASS INDEX: 33.63 KG/M2 | WEIGHT: 197 LBS | RESPIRATION RATE: 18 BRPM | HEART RATE: 76 BPM

## 2024-05-07 DIAGNOSIS — L40.50 PSORIATIC ARTHRITIS (HCC): Primary | ICD-10-CM

## 2024-05-07 DIAGNOSIS — M79.674 PAIN IN RIGHT TOE(S): ICD-10-CM

## 2024-05-07 DIAGNOSIS — L40.9 PSORIASIS: ICD-10-CM

## 2024-05-07 DIAGNOSIS — M76.61 RIGHT ACHILLES TENDINITIS: ICD-10-CM

## 2024-05-07 DIAGNOSIS — R79.82 ELEVATED HIGH SENSITIVITY C-REACTIVE PROTEIN: ICD-10-CM

## 2024-05-07 DIAGNOSIS — R74.01 ELEVATED AST (SGOT): ICD-10-CM

## 2024-05-07 DIAGNOSIS — M79.675 PAIN IN LEFT TOE(S): ICD-10-CM

## 2024-05-07 DIAGNOSIS — R21 RASH: ICD-10-CM

## 2024-05-07 DIAGNOSIS — Z51.81 MEDICATION MONITORING ENCOUNTER: ICD-10-CM

## 2024-05-07 PROCEDURE — 3074F SYST BP LT 130 MM HG: CPT | Performed by: INTERNAL MEDICINE

## 2024-05-07 PROCEDURE — 3078F DIAST BP <80 MM HG: CPT | Performed by: INTERNAL MEDICINE

## 2024-05-07 PROCEDURE — 99214 OFFICE O/P EST MOD 30 MIN: CPT | Performed by: INTERNAL MEDICINE

## 2024-05-07 RX ORDER — IXEKIZUMAB 80 MG/ML
80 INJECTION, SOLUTION SUBCUTANEOUS
Qty: 1 ML | Refills: 5 | Status: ACTIVE | OUTPATIENT
Start: 2024-05-07

## 2024-05-07 RX ORDER — DICLOFENAC SODIUM 20 MG/G
1 SOLUTION TOPICAL 2 TIMES DAILY PRN
Qty: 112 G | Refills: 2 | Status: SHIPPED | OUTPATIENT
Start: 2024-05-07

## 2024-05-07 ASSESSMENT — ROUTINE ASSESSMENT OF PATIENT INDEX DATA (RAPID3)
ON A SCALE OF ONE TO TEN, HOW MUCH OF A PROBLEM HAS UNUSUAL FATIGUE OR TIREDNESS BEEN FOR YOU OVER THE PAST WEEK?: 0
ON A SCALE OF ONE TO TEN, HOW MUCH PAIN HAVE YOU HAD BECAUSE OF YOUR CONDITION OVER THE PAST WEEK?: 2
ON A SCALE OF ONE TO TEN, HOW DIFFICULT WAS IT FOR YOU TO COMPLETE THE LISTED DAILY PHYSICAL TASKS OVER THE LAST WEEK: 0.2
WHEN YOU AWAKENED IN THE MORNING OVER THE LAST WEEK, PLEASE INDICATE THE AMOUNT OF TIME IT TAKES UNTIL YOU ARE AS LIMBER AS YOU WILL BE FOR THE DAY: 30 MIN
ON A SCALE OF ONE TO TEN, CONSIDERING ALL THE WAYS IN WHICH ILLNESS AND HEALTH CONDITIONS MAY AFFECT YOU AT THIS TIME, PLEASE INDICATE BELOW HOW YOU ARE DOING:: 3

## 2024-05-07 NOTE — PATIENT INSTRUCTIONS
Ibuprofen 800 mg three times daily    Return in 6 months, do labs in chart one week prior to visit

## 2024-05-07 NOTE — PROGRESS NOTES
`5/7/2024    SUBJECTIVE:  Servando Greene is a 38 y.o. 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 Maintained on taltz. Last dose 4/15/2024. No skipped or missed dose or side effects including fever or unexplained infection    Reports 1 month rash on bilateral plantar surface feet - thinks that this could be athlete's foot. It is itchy    Continues to have bilateral #1 MTP pain and swelling that is constant.     No further jock itch, normal nails, no psoriasis    R heel pain without swelling- episodic - can't run or jump but able to workout, lay with kids. Tried aleve without benefit. Tries to do stretches learned with Dr. Wilson but cannot tolerate wear boot QHS. Symptoms are stable - increase with more physical activity but he can do baseline amount and recover by the next day.   Previously saw Dr. Wilson-note reviewed March-28-20 22-diagnosed with right Achilles tendinitis and recommended to return if symptoms increase.  Since Aleve has not been beneficial he is not using it.      Noticing enlargement in bilateral big toes.  Denies dactylitis  Bilateral Eye redness & itching- unchaged - found to have pink eye and recommended to f/u for complete exam but he hasn't. episodic blurry vision. Episodic eye pain without dryness. +photophobia. Using eye drop otc  TID. [facility]    No rash or nail changes  Denies bloody bowel movements, stomach pain, nausea, vomiting        REVIEW OF SYSTEMS:  A total of 10 systems (musculoskeletal system as stated in the HPI and the following 9 systems) were reviewed with patient today and were negative except as stated in the HPI and except for the following (depicted with an \"X\"):        \"X\" Constitutional  \"X\" HEENT

## 2024-05-07 NOTE — TELEPHONE ENCOUNTER
Pt ov moved up from waitlist.  Seen in clinic today, meds refilled.  Given order for CRP, lab did hscrp and could not do add on.  Requested pt go downstairs after checkout to complete.

## 2024-07-08 ENCOUNTER — APPOINTMENT (RX ONLY)
Dept: URBAN - METROPOLITAN AREA CLINIC 25 | Facility: CLINIC | Age: 39
Setting detail: DERMATOLOGY
End: 2024-07-08

## 2024-07-08 DIAGNOSIS — D22 MELANOCYTIC NEVI: ICD-10-CM

## 2024-07-08 DIAGNOSIS — Z71.89 OTHER SPECIFIED COUNSELING: ICD-10-CM

## 2024-07-08 DIAGNOSIS — L81.4 OTHER MELANIN HYPERPIGMENTATION: ICD-10-CM

## 2024-07-08 DIAGNOSIS — L84 CORNS AND CALLOSITIES: ICD-10-CM

## 2024-07-08 DIAGNOSIS — Z85.820 PERSONAL HISTORY OF MALIGNANT MELANOMA OF SKIN: ICD-10-CM

## 2024-07-08 DIAGNOSIS — L82.1 OTHER SEBORRHEIC KERATOSIS: ICD-10-CM

## 2024-07-08 DIAGNOSIS — L57.8 OTHER SKIN CHANGES DUE TO CHRONIC EXPOSURE TO NONIONIZING RADIATION: ICD-10-CM

## 2024-07-08 DIAGNOSIS — Z87.2 PERSONAL HISTORY OF DISEASES OF THE SKIN AND SUBCUTANEOUS TISSUE: ICD-10-CM

## 2024-07-08 DIAGNOSIS — D18.0 HEMANGIOMA: ICD-10-CM

## 2024-07-08 DIAGNOSIS — L30.4 ERYTHEMA INTERTRIGO: ICD-10-CM

## 2024-07-08 PROBLEM — D18.01 HEMANGIOMA OF SKIN AND SUBCUTANEOUS TISSUE: Status: ACTIVE | Noted: 2024-07-08

## 2024-07-08 PROBLEM — D23.39 OTHER BENIGN NEOPLASM OF SKIN OF OTHER PARTS OF FACE: Status: ACTIVE | Noted: 2024-07-08

## 2024-07-08 PROBLEM — D22.5 MELANOCYTIC NEVI OF TRUNK: Status: ACTIVE | Noted: 2024-07-08

## 2024-07-08 PROCEDURE — ? ADDITIONAL NOTES

## 2024-07-08 PROCEDURE — ? COUNSELING

## 2024-07-08 PROCEDURE — ? PRESCRIPTION MEDICATION MANAGEMENT

## 2024-07-08 PROCEDURE — 99213 OFFICE O/P EST LOW 20 MIN: CPT

## 2024-07-08 ASSESSMENT — LOCATION DETAILED DESCRIPTION DERM
LOCATION DETAILED: RIGHT LATERAL INFERIOR CHEST
LOCATION DETAILED: LEFT CENTRAL MALAR CHEEK
LOCATION DETAILED: LEFT INGUINAL CREASE
LOCATION DETAILED: RIGHT MEDIAL KNEE
LOCATION DETAILED: LEFT MEDIAL GREAT TOE
LOCATION DETAILED: RIGHT INGUINAL CREASE
LOCATION DETAILED: INFERIOR THORACIC SPINE
LOCATION DETAILED: STERNAL NOTCH
LOCATION DETAILED: RIGHT CENTRAL MALAR CHEEK
LOCATION DETAILED: SUPERIOR THORACIC SPINE
LOCATION DETAILED: PERIUMBILICAL SKIN
LOCATION DETAILED: LEFT ANTERIOR SHOULDER

## 2024-07-08 ASSESSMENT — LOCATION SIMPLE DESCRIPTION DERM
LOCATION SIMPLE: GROIN
LOCATION SIMPLE: RIGHT KNEE
LOCATION SIMPLE: ABDOMEN
LOCATION SIMPLE: LEFT CHEEK
LOCATION SIMPLE: LEFT SHOULDER
LOCATION SIMPLE: CHEST
LOCATION SIMPLE: RIGHT CHEEK
LOCATION SIMPLE: LEFT GREAT TOE
LOCATION SIMPLE: UPPER BACK

## 2024-07-08 ASSESSMENT — LOCATION ZONE DERM
LOCATION ZONE: TOE
LOCATION ZONE: LEG
LOCATION ZONE: ARM
LOCATION ZONE: TRUNK
LOCATION ZONE: FACE

## 2024-07-08 NOTE — PROCEDURE: REASSURANCE
Hide Additional Notes?: No
Detail Level: Detailed
Additional Notes (Optional): Bled into a callus, he has limited mobility in toes due to arthritis per patient

## 2024-07-08 NOTE — PROCEDURE: ADDITIONAL NOTES
Detail Level: Simple
Additional Notes: Invasive melanoma requiring a sentinel lymph node biopsy, which was negative. Treated in 2017 at [facility].\\nRequesting records again, not received \\Kathleen this time patient wants to stick with annual check ups, not every 6 months.
Render Risk Assessment In Note?: no

## 2024-07-08 NOTE — PROCEDURE: PRESCRIPTION MEDICATION MANAGEMENT
Detail Level: Zone
Render In Strict Bullet Format?: No
Plan: Pt will call for prescription of Ketoconazole Cream if he fails to improve with OTC treatment.
Initiate Treatment: Zeasorb AF (Apply daily to AA as needed)

## 2024-10-08 ENCOUNTER — TELEPHONE (OUTPATIENT)
Dept: RHEUMATOLOGY | Age: 39
End: 2024-10-08

## 2024-10-08 NOTE — TELEPHONE ENCOUNTER
[employer] requested PA renewal online on [facility]. Completed and sent with clinicals from May.   Your request has been approved  CaseId:[id number];Status:Approved;Review Type:Prior Auth;Coverage Start Date:09/08/2024;Coverage End Date:10/08/2025;

## 2024-11-06 ENCOUNTER — TELEPHONE (OUTPATIENT)
Dept: RHEUMATOLOGY | Age: 39
End: 2024-11-06

## 2024-11-06 DIAGNOSIS — L40.50 PSORIATIC ARTHRITIS (HCC): ICD-10-CM

## 2024-11-06 RX ORDER — IXEKIZUMAB 80 MG/ML
80 INJECTION, SOLUTION SUBCUTANEOUS
Qty: 1 ML | Refills: 0 | Status: ACTIVE | OUTPATIENT
Start: 2024-11-06 | End: 2024-11-12 | Stop reason: SDUPTHER

## 2024-11-06 NOTE — TELEPHONE ENCOUNTER
Pt called and needs a refill on Taltz, he wont have enough to last until his OV. He is getting labs done 11/7/2024    LOV- 5/7/2024  NOV- 11/11/2024

## 2024-11-07 DIAGNOSIS — Z51.81 MEDICATION MONITORING ENCOUNTER: ICD-10-CM

## 2024-11-07 DIAGNOSIS — M79.675 PAIN IN LEFT TOE(S): ICD-10-CM

## 2024-11-07 DIAGNOSIS — M76.61 RIGHT ACHILLES TENDINITIS: ICD-10-CM

## 2024-11-07 DIAGNOSIS — M79.674 PAIN IN RIGHT TOE(S): ICD-10-CM

## 2024-11-07 DIAGNOSIS — L40.50 PSORIATIC ARTHRITIS (HCC): ICD-10-CM

## 2024-11-07 DIAGNOSIS — R74.01 ELEVATED AST (SGOT): ICD-10-CM

## 2024-11-07 LAB
ALBUMIN SERPL-MCNC: 4.4 G/DL (ref 3.5–5)
ALBUMIN/GLOB SERPL: 1.5 (ref 1–1.9)
ALP SERPL-CCNC: 62 U/L (ref 40–129)
ALT SERPL-CCNC: 38 U/L (ref 8–55)
ANION GAP SERPL CALC-SCNC: 11 MMOL/L (ref 7–16)
AST SERPL-CCNC: 40 U/L (ref 15–37)
BASOPHILS # BLD: 0 K/UL (ref 0–0.2)
BASOPHILS NFR BLD: 0 % (ref 0–2)
BILIRUB SERPL-MCNC: 0.2 MG/DL (ref 0–1.2)
BUN SERPL-MCNC: 27 MG/DL (ref 6–23)
CALCIUM SERPL-MCNC: 9.7 MG/DL (ref 8.8–10.2)
CHLORIDE SERPL-SCNC: 102 MMOL/L (ref 98–107)
CO2 SERPL-SCNC: 26 MMOL/L (ref 20–29)
CREAT SERPL-MCNC: 1.34 MG/DL (ref 0.8–1.3)
CRP SERPL-MCNC: 0.9 MG/DL (ref 0–0.4)
DIFFERENTIAL METHOD BLD: ABNORMAL
EOSINOPHIL # BLD: 0 K/UL (ref 0–0.8)
EOSINOPHIL NFR BLD: 0 % (ref 0.5–7.8)
ERYTHROCYTE [DISTWIDTH] IN BLOOD BY AUTOMATED COUNT: 13.2 % (ref 11.9–14.6)
ERYTHROCYTE [SEDIMENTATION RATE] IN BLOOD: 1 MM/HR (ref 0–20)
GLOBULIN SER CALC-MCNC: 3 G/DL (ref 2.3–3.5)
GLUCOSE SERPL-MCNC: 95 MG/DL (ref 70–99)
HCT VFR BLD AUTO: 47.2 % (ref 41.1–50.3)
HGB BLD-MCNC: 15.5 G/DL (ref 13.6–17.2)
IMM GRANULOCYTES # BLD AUTO: 0 K/UL (ref 0–0.5)
IMM GRANULOCYTES NFR BLD AUTO: 0 % (ref 0–5)
LYMPHOCYTES # BLD: 1.6 K/UL (ref 0.5–4.6)
LYMPHOCYTES NFR BLD: 34 % (ref 13–44)
MCH RBC QN AUTO: 29.1 PG (ref 26.1–32.9)
MCHC RBC AUTO-ENTMCNC: 32.8 G/DL (ref 31.4–35)
MCV RBC AUTO: 88.7 FL (ref 82–102)
MONOCYTES # BLD: 0.6 K/UL (ref 0.1–1.3)
MONOCYTES NFR BLD: 12 % (ref 4–12)
NEUTS SEG # BLD: 2.6 K/UL (ref 1.7–8.2)
NEUTS SEG NFR BLD: 54 % (ref 43–78)
NRBC # BLD: 0 K/UL (ref 0–0.2)
PLATELET # BLD AUTO: 256 K/UL (ref 150–450)
PMV BLD AUTO: 9.9 FL (ref 9.4–12.3)
POTASSIUM SERPL-SCNC: 4.9 MMOL/L (ref 3.5–5.1)
PROT SERPL-MCNC: 7.4 G/DL (ref 6.3–8.2)
RBC # BLD AUTO: 5.32 M/UL (ref 4.23–5.6)
SODIUM SERPL-SCNC: 139 MMOL/L (ref 136–145)
WBC # BLD AUTO: 4.9 K/UL (ref 4.3–11.1)

## 2024-11-12 ENCOUNTER — OFFICE VISIT (OUTPATIENT)
Dept: RHEUMATOLOGY | Age: 39
End: 2024-11-12
Payer: COMMERCIAL

## 2024-11-12 VITALS
BODY MASS INDEX: 31.69 KG/M2 | HEART RATE: 74 BPM | HEIGHT: 66 IN | RESPIRATION RATE: 12 BRPM | WEIGHT: 197.2 LBS | SYSTOLIC BLOOD PRESSURE: 132 MMHG | OXYGEN SATURATION: 98 % | DIASTOLIC BLOOD PRESSURE: 84 MMHG

## 2024-11-12 DIAGNOSIS — M76.61 RIGHT ACHILLES TENDINITIS: ICD-10-CM

## 2024-11-12 DIAGNOSIS — H57.89 EYE REDNESS: Primary | ICD-10-CM

## 2024-11-12 DIAGNOSIS — R21 RASH: ICD-10-CM

## 2024-11-12 DIAGNOSIS — L40.50 PSORIATIC ARTHRITIS (HCC): ICD-10-CM

## 2024-11-12 DIAGNOSIS — R74.01 ELEVATED AST (SGOT): ICD-10-CM

## 2024-11-12 DIAGNOSIS — Z23 ENCOUNTER FOR VACCINATION: ICD-10-CM

## 2024-11-12 DIAGNOSIS — L40.9 PSORIASIS: ICD-10-CM

## 2024-11-12 DIAGNOSIS — B35.3 TINEA PEDIS OF BOTH FEET: ICD-10-CM

## 2024-11-12 DIAGNOSIS — R79.82 ELEVATED C-REACTIVE PROTEIN (CRP): ICD-10-CM

## 2024-11-12 PROCEDURE — 3075F SYST BP GE 130 - 139MM HG: CPT | Performed by: INTERNAL MEDICINE

## 2024-11-12 PROCEDURE — 99214 OFFICE O/P EST MOD 30 MIN: CPT | Performed by: INTERNAL MEDICINE

## 2024-11-12 PROCEDURE — 3079F DIAST BP 80-89 MM HG: CPT | Performed by: INTERNAL MEDICINE

## 2024-11-12 RX ORDER — TERBINAFINE HYDROCHLORIDE 250 MG/1
250 TABLET ORAL DAILY
Qty: 14 TABLET | Refills: 0 | Status: SHIPPED | OUTPATIENT
Start: 2024-11-12 | End: 2024-11-26

## 2024-11-12 RX ORDER — IXEKIZUMAB 80 MG/ML
80 INJECTION, SOLUTION SUBCUTANEOUS
Qty: 3 ML | Refills: 1 | Status: ACTIVE | OUTPATIENT
Start: 2024-11-12

## 2024-11-12 ASSESSMENT — ROUTINE ASSESSMENT OF PATIENT INDEX DATA (RAPID3)
ON A SCALE OF ONE TO TEN, HOW MUCH PAIN HAVE YOU HAD BECAUSE OF YOUR CONDITION OVER THE PAST WEEK?: 1
WHEN YOU AWAKENED IN THE MORNING OVER THE LAST WEEK, PLEASE INDICATE THE AMOUNT OF TIME IT TAKES UNTIL YOU ARE AS LIMBER AS YOU WILL BE FOR THE DAY: 15 MIN
ON A SCALE OF ONE TO TEN, CONSIDERING ALL THE WAYS IN WHICH ILLNESS AND HEALTH CONDITIONS MAY AFFECT YOU AT THIS TIME, PLEASE INDICATE BELOW HOW YOU ARE DOING:: 1
ON A SCALE OF ONE TO TEN, HOW DIFFICULT WAS IT FOR YOU TO COMPLETE THE LISTED DAILY PHYSICAL TASKS OVER THE LAST WEEK: 0.2
ON A SCALE OF ONE TO TEN, HOW MUCH OF A PROBLEM HAS UNUSUAL FATIGUE OR TIREDNESS BEEN FOR YOU OVER THE PAST WEEK?: 1

## 2024-11-12 NOTE — PROGRESS NOTES
No Known Allergies        PHYSICAL EXAM:  /84 (Site: Left Upper Arm, Position: Sitting, Cuff Size: Medium Adult)   Pulse 74   Resp 12   Ht 1.676 m (5' 6\")   Wt 89.4 kg (197 lb 3.2 oz)   SpO2 98%   BMI 31.83 kg/m²   CONSTITUTIONAL:  The patient was in NAD.  ENT:  The OPC, MMM, lips/teeth/gums without abnormalities.  NECK:  No masses or thyromegaly  LYMPH NODES:  No cervical or axillary lymphadenopathy.  CV:  RRR no r/m/g.  Normal peripheral pulses  RESP:  CTA bilaterally.  Normal respiratory effort.  GI:  Abdomen soft, non tender, no hepatosplenomegaly  Skin: +bilateral dorsum of feet: Scaly rash generalized without erythema. Erythema dorsum bilateral feet and R achilles tendon. No other rashes, nodules, or other lesions.    MUSCULOSKELETAL :  All joints including neck, back bilateral shoulders, elbows, wrists, MCP's, PIP's, DIP's, hips, knees, ankles, toes are within normal limits including normal gross examination, no synovitis, no tenderness, n'l ROM EXCEPT:   S: none,   Bony prominence bilateral #1 MTP    ASSESSMENT AND PLAN:  Servando Greene is a 39 y.o. male that is here for evaluation of psoriatic arthritis.  his problems include:    Servando was seen today for follow-up.    Diagnoses and all orders for this visit:    Psoriatic arthritis (HCC), Psoriasis +b27    Medication monitoring encounter    Elevated CRP    Rash  -Continue Taltz every 4 weeks.  Appears medication is improving inflammatory joint complaints but now with new rash that is not clearly psoriasis.    -Encouraged flu shot which he declines      eye redness  Request ophthalmology note.  Referral placed to ophthalmology    Right Achilles tendinitis    Ongoing chronic symptoms of right Achilles tendinitis stable.  - Encouraged icing    Elevated AST: Less than 2 times upper limit of normal-monitor    Rash of left wrist, tinea pedis bilateral feet: Given 2-week prescription of terbinafine 250 Mg to complete 6 weeks in total. Start topical

## 2024-12-26 DIAGNOSIS — I10 PRIMARY HYPERTENSION: ICD-10-CM

## 2024-12-26 NOTE — TELEPHONE ENCOUNTER
MEDICATION REFILL REQUEST      Name of Medication:  Lisinopril   Dose:  10 mg  Frequency:    Quantity:    Days' supply:  90      Pharmacy Name/Location:  [facility]

## 2024-12-26 NOTE — TELEPHONE ENCOUNTER
Patient has not been seen in office since November 2024 and will need follow up. Called patient and left a voicemail

## 2024-12-27 RX ORDER — LISINOPRIL 10 MG/1
10 TABLET ORAL DAILY
Qty: 30 TABLET | Refills: 0 | Status: SHIPPED | OUTPATIENT
Start: 2024-12-27

## 2024-12-27 NOTE — TELEPHONE ENCOUNTER
Requested Prescriptions     Pending Prescriptions Disp Refills    lisinopril (PRINIVIL;ZESTRIL) 10 MG tablet 30 tablet 0     Sig: Take 1 tablet by mouth daily      Verified rx. Last OV 11/28/23. Erx to pharm on file.

## 2025-01-08 ENCOUNTER — OFFICE VISIT (OUTPATIENT)
Age: 40
End: 2025-01-08
Payer: COMMERCIAL

## 2025-01-08 VITALS
WEIGHT: 198.4 LBS | HEIGHT: 64 IN | BODY MASS INDEX: 33.87 KG/M2 | DIASTOLIC BLOOD PRESSURE: 78 MMHG | SYSTOLIC BLOOD PRESSURE: 138 MMHG | HEART RATE: 70 BPM

## 2025-01-08 DIAGNOSIS — I10 PRIMARY HYPERTENSION: Primary | ICD-10-CM

## 2025-01-08 PROCEDURE — 93000 ELECTROCARDIOGRAM COMPLETE: CPT | Performed by: INTERNAL MEDICINE

## 2025-01-08 PROCEDURE — 3075F SYST BP GE 130 - 139MM HG: CPT | Performed by: INTERNAL MEDICINE

## 2025-01-08 PROCEDURE — 3078F DIAST BP <80 MM HG: CPT | Performed by: INTERNAL MEDICINE

## 2025-01-08 PROCEDURE — 99213 OFFICE O/P EST LOW 20 MIN: CPT | Performed by: INTERNAL MEDICINE

## 2025-01-08 RX ORDER — LISINOPRIL 10 MG/1
10 TABLET ORAL DAILY
Qty: 90 TABLET | Refills: 3 | Status: SHIPPED | OUTPATIENT
Start: 2025-01-08

## 2025-01-08 ASSESSMENT — ENCOUNTER SYMPTOMS
ABDOMINAL PAIN: 0
ORTHOPNEA: 0
SHORTNESS OF BREATH: 0

## 2025-01-08 NOTE — PROGRESS NOTES
[facility] CARDIOLOGY  [address] [place] [address], [address]  [place] [address]  PHONE: [phone]      25    NAME:  Servando Greene  : 1985  MRN: [id number]         SUBJECTIVE:   Servando Greene is a 39 y.o. male seen for a follow up visit regarding the following:     Chief Complaint   Patient presents with    Hyperlipidemia    Hypertension    Palpitations            HPI:  Follow up  Hyperlipidemia, Hypertension, and Palpitations   .    Mr. Greene presents today for follow-up.  Patient is doing well. Blood pressure he reports has been ranging in the 130s over 80s.  18 months clean and sober after entering a rehab treatment center for alcohol and benzo use.  Feels better.  Works out frequently.  Denies chest pain, shortness of orthopnea, PND, palpitations, syncope or lower extremity swelling.    Hyperlipidemia  Pertinent negatives include no chest pain, focal weakness or shortness of breath.   Hypertension  Associated symptoms include palpitations. Pertinent negatives include no chest pain, orthopnea, PND or shortness of breath.   Palpitations   Pertinent negatives include no chest pain, diaphoresis, fever, near-syncope or shortness of breath.           Cardiac Medications       ACE Inhibitors       lisinopril (PRINIVIL;ZESTRIL) 10 MG tablet Take 1 tablet by mouth daily                    Past Medical History, Past Surgical History, Family history, Social History, and Medications were all reviewed with the patient today and updated as necessary.     Prior to Admission medications    Medication Sig Start Date End Date Taking? Authorizing Provider   lisinopril (PRINIVIL;ZESTRIL) 10 MG tablet Take 1 tablet by mouth daily 24  Yes Jayjay Monique III, MD   ixekizumab (TALTZ) 80 MG/ML SOAJ prefilled syringe Inject 1 mL into the skin every 28 days 24  Yes Taryn Loya MD   Diclofenac Sodium 2 % SOLN Apply 1 Pump topically 2 times daily as needed (joint pain)  Patient not taking: Reported on

## 2025-07-10 ENCOUNTER — APPOINTMENT (OUTPATIENT)
Dept: URBAN - METROPOLITAN AREA CLINIC 25 | Facility: CLINIC | Age: 40
Setting detail: DERMATOLOGY
End: 2025-07-10

## 2025-07-10 DIAGNOSIS — D18.0 HEMANGIOMA: ICD-10-CM

## 2025-07-10 DIAGNOSIS — Z71.89 OTHER SPECIFIED COUNSELING: ICD-10-CM

## 2025-07-10 DIAGNOSIS — L57.8 OTHER SKIN CHANGES DUE TO CHRONIC EXPOSURE TO NONIONIZING RADIATION: ICD-10-CM

## 2025-07-10 DIAGNOSIS — Z85.820 PERSONAL HISTORY OF MALIGNANT MELANOMA OF SKIN: ICD-10-CM

## 2025-07-10 DIAGNOSIS — Z87.2 PERSONAL HISTORY OF DISEASES OF THE SKIN AND SUBCUTANEOUS TISSUE: ICD-10-CM

## 2025-07-10 DIAGNOSIS — L82.1 OTHER SEBORRHEIC KERATOSIS: ICD-10-CM

## 2025-07-10 DIAGNOSIS — D22 MELANOCYTIC NEVI: ICD-10-CM

## 2025-07-10 DIAGNOSIS — L81.4 OTHER MELANIN HYPERPIGMENTATION: ICD-10-CM

## 2025-07-10 PROBLEM — D18.01 HEMANGIOMA OF SKIN AND SUBCUTANEOUS TISSUE: Status: ACTIVE | Noted: 2025-07-10

## 2025-07-10 PROBLEM — D22.5 MELANOCYTIC NEVI OF TRUNK: Status: ACTIVE | Noted: 2025-07-10

## 2025-07-10 PROCEDURE — ? ADDITIONAL NOTES

## 2025-07-10 PROCEDURE — ? COUNSELING

## 2025-07-10 ASSESSMENT — LOCATION ZONE DERM
LOCATION ZONE: FACE
LOCATION ZONE: LEG
LOCATION ZONE: ARM
LOCATION ZONE: TRUNK

## 2025-07-10 ASSESSMENT — LOCATION DETAILED DESCRIPTION DERM
LOCATION DETAILED: INFERIOR THORACIC SPINE
LOCATION DETAILED: LEFT CENTRAL MALAR CHEEK
LOCATION DETAILED: RIGHT MEDIAL KNEE
LOCATION DETAILED: LEFT ANTERIOR SHOULDER
LOCATION DETAILED: RIGHT LATERAL INFERIOR CHEST
LOCATION DETAILED: STERNAL NOTCH
LOCATION DETAILED: RIGHT CENTRAL MALAR CHEEK
LOCATION DETAILED: SUPERIOR THORACIC SPINE

## 2025-07-10 ASSESSMENT — LOCATION SIMPLE DESCRIPTION DERM
LOCATION SIMPLE: RIGHT CHEEK
LOCATION SIMPLE: LEFT SHOULDER
LOCATION SIMPLE: LEFT CHEEK
LOCATION SIMPLE: CHEST
LOCATION SIMPLE: RIGHT KNEE
LOCATION SIMPLE: UPPER BACK

## 2025-07-10 NOTE — PROCEDURE: ADDITIONAL NOTES
Detail Level: Simple
Additional Notes: Invasive melanoma requiring a sentinel lymph node biopsy, which was negative. Treated in 2017 at [facility].\\nRequested records were not received after multiple attempts \\Kathleen this time patient wants to stick with annual check ups, not every 6 months.\\nReviewed importance of at home exams \\nEye, dental exams \\nNeg LN exam
Render Risk Assessment In Note?: no